# Patient Record
Sex: FEMALE | Race: BLACK OR AFRICAN AMERICAN | NOT HISPANIC OR LATINO | Employment: OTHER | URBAN - METROPOLITAN AREA
[De-identification: names, ages, dates, MRNs, and addresses within clinical notes are randomized per-mention and may not be internally consistent; named-entity substitution may affect disease eponyms.]

---

## 2017-01-12 ENCOUNTER — HISTORICAL (OUTPATIENT)
Dept: INTERNAL MEDICINE | Facility: CLINIC | Age: 57
End: 2017-01-12

## 2017-03-15 ENCOUNTER — HISTORICAL (OUTPATIENT)
Dept: INTERNAL MEDICINE | Facility: CLINIC | Age: 57
End: 2017-03-15

## 2017-06-22 ENCOUNTER — HISTORICAL (OUTPATIENT)
Dept: CARDIOLOGY | Facility: HOSPITAL | Age: 57
End: 2017-06-22

## 2018-01-15 ENCOUNTER — HISTORICAL (OUTPATIENT)
Dept: RADIOLOGY | Facility: HOSPITAL | Age: 58
End: 2018-01-15

## 2018-01-24 ENCOUNTER — HISTORICAL (OUTPATIENT)
Dept: RADIOLOGY | Facility: HOSPITAL | Age: 58
End: 2018-01-24

## 2018-02-01 ENCOUNTER — HISTORICAL (OUTPATIENT)
Dept: RADIOLOGY | Facility: HOSPITAL | Age: 58
End: 2018-02-01

## 2018-05-04 ENCOUNTER — HISTORICAL (OUTPATIENT)
Dept: INTERNAL MEDICINE | Facility: CLINIC | Age: 58
End: 2018-05-04

## 2018-05-04 LAB
ABS NEUT (OLG): 5.12 X10(3)/MCL (ref 2.1–9.2)
ALBUMIN SERPL-MCNC: 3.7 GM/DL (ref 3.4–5)
ALBUMIN/GLOB SERPL: 1 RATIO (ref 1–2)
ALP SERPL-CCNC: 104 UNIT/L (ref 45–117)
ALT SERPL-CCNC: 23 UNIT/L (ref 12–78)
AST SERPL-CCNC: 20 UNIT/L (ref 15–37)
BASOPHILS # BLD AUTO: 0.04 X10(3)/MCL
BASOPHILS NFR BLD AUTO: 0 %
BILIRUB SERPL-MCNC: 0.4 MG/DL (ref 0.2–1)
BILIRUBIN DIRECT+TOT PNL SERPL-MCNC: 0.1 MG/DL
BILIRUBIN DIRECT+TOT PNL SERPL-MCNC: 0.3 MG/DL
BUN SERPL-MCNC: 11 MG/DL (ref 7–18)
CALCIUM SERPL-MCNC: 8.1 MG/DL (ref 8.5–10.1)
CHLORIDE SERPL-SCNC: 107 MMOL/L (ref 98–107)
CHOLEST SERPL-MCNC: 187 MG/DL
CHOLEST/HDLC SERPL: 2.8 {RATIO} (ref 0–4.4)
CO2 SERPL-SCNC: 26 MMOL/L (ref 21–32)
CREAT SERPL-MCNC: 0.9 MG/DL (ref 0.6–1.3)
EOSINOPHIL # BLD AUTO: 0.1 X10(3)/MCL
EOSINOPHIL NFR BLD AUTO: 1 %
ERYTHROCYTE [DISTWIDTH] IN BLOOD BY AUTOMATED COUNT: 13.5 % (ref 11.5–14.5)
EST. AVERAGE GLUCOSE BLD GHB EST-MCNC: 120 MG/DL
GLOBULIN SER-MCNC: 4.1 GM/ML (ref 2.3–3.5)
GLUCOSE SERPL-MCNC: 96 MG/DL (ref 74–106)
HBA1C MFR BLD: 5.8 % (ref 4.2–6.3)
HCT VFR BLD AUTO: 39.9 % (ref 35–46)
HDLC SERPL-MCNC: 66 MG/DL
HGB BLD-MCNC: 13 GM/DL (ref 12–16)
IMM GRANULOCYTES # BLD AUTO: 0.02 10*3/UL
IMM GRANULOCYTES NFR BLD AUTO: 0 %
LDLC SERPL CALC-MCNC: 67 MG/DL (ref 0–130)
LYMPHOCYTES # BLD AUTO: 2.3 X10(3)/MCL
LYMPHOCYTES NFR BLD AUTO: 28 % (ref 13–40)
MCH RBC QN AUTO: 31.9 PG (ref 26–34)
MCHC RBC AUTO-ENTMCNC: 32.6 GM/DL (ref 31–37)
MCV RBC AUTO: 97.8 FL (ref 80–100)
MONOCYTES # BLD AUTO: 0.65 X10(3)/MCL
MONOCYTES NFR BLD AUTO: 8 % (ref 4–12)
NEUTROPHILS # BLD AUTO: 5.12 X10(3)/MCL
NEUTROPHILS NFR BLD AUTO: 62 X10(3)/MCL
PLATELET # BLD AUTO: 351 X10(3)/MCL (ref 130–400)
PMV BLD AUTO: 9.3 FL (ref 7.4–10.4)
POTASSIUM SERPL-SCNC: 3.6 MMOL/L (ref 3.5–5.1)
PROT SERPL-MCNC: 7.8 GM/DL (ref 6.4–8.2)
RBC # BLD AUTO: 4.08 X10(6)/MCL (ref 4–5.2)
SODIUM SERPL-SCNC: 139 MMOL/L (ref 136–145)
TRIGL SERPL-MCNC: 269 MG/DL
VLDLC SERPL CALC-MCNC: 54 MG/DL
WBC # SPEC AUTO: 8.2 X10(3)/MCL (ref 4.5–11)

## 2018-05-07 ENCOUNTER — HISTORICAL (OUTPATIENT)
Dept: INTERNAL MEDICINE | Facility: CLINIC | Age: 58
End: 2018-05-07

## 2018-05-07 LAB
APPEARANCE, UA: ABNORMAL
BACTERIA #/AREA URNS AUTO: ABNORMAL /[HPF]
BILIRUB UR QL STRIP: NEGATIVE
COLOR UR: YELLOW
GLUCOSE (UA): NORMAL
HGB UR QL STRIP: NEGATIVE
HYALINE CASTS #/AREA URNS LPF: ABNORMAL /[LPF]
KETONES UR QL STRIP: NEGATIVE
LEUKOCYTE ESTERASE UR QL STRIP: 500 LEU/UL
MUCOUS THREADS URNS QL MICRO: ABNORMAL
NITRITE UR QL STRIP: NEGATIVE
PH UR STRIP: 6.5 [PH] (ref 4.5–8)
PROT UR QL STRIP: 10 MG/DL
RBC #/AREA URNS AUTO: ABNORMAL /[HPF]
SP GR UR STRIP: 1.02 (ref 1–1.03)
SQUAMOUS #/AREA URNS LPF: >100 /[LPF]
UROBILINOGEN UR STRIP-ACNC: NORMAL
WBC #/AREA URNS AUTO: ABNORMAL /HPF

## 2018-06-19 ENCOUNTER — HISTORICAL (OUTPATIENT)
Dept: RADIOLOGY | Facility: HOSPITAL | Age: 58
End: 2018-06-19

## 2018-09-25 ENCOUNTER — HISTORICAL (OUTPATIENT)
Dept: RADIOLOGY | Facility: HOSPITAL | Age: 58
End: 2018-09-25

## 2018-10-08 ENCOUNTER — HISTORICAL (OUTPATIENT)
Dept: ADMINISTRATIVE | Facility: HOSPITAL | Age: 58
End: 2018-10-08

## 2018-10-15 ENCOUNTER — HISTORICAL (OUTPATIENT)
Dept: INTERNAL MEDICINE | Facility: CLINIC | Age: 58
End: 2018-10-15

## 2018-11-07 ENCOUNTER — HISTORICAL (OUTPATIENT)
Dept: RADIOLOGY | Facility: HOSPITAL | Age: 58
End: 2018-11-07

## 2018-11-16 ENCOUNTER — HISTORICAL (OUTPATIENT)
Dept: RADIOLOGY | Facility: HOSPITAL | Age: 58
End: 2018-11-16

## 2019-12-04 ENCOUNTER — HISTORICAL (OUTPATIENT)
Dept: ADMINISTRATIVE | Facility: HOSPITAL | Age: 59
End: 2019-12-04

## 2019-12-04 LAB
ABS NEUT (OLG): 4.23 X10(3)/MCL (ref 2.1–9.2)
ALBUMIN SERPL-MCNC: 3.8 GM/DL (ref 3.4–5)
ALBUMIN/GLOB SERPL: 0.9 RATIO (ref 1.1–2)
ALP SERPL-CCNC: 102 UNIT/L (ref 45–117)
ALT SERPL-CCNC: 23 UNIT/L (ref 12–78)
AST SERPL-CCNC: 17 UNIT/L (ref 15–37)
BASOPHILS # BLD AUTO: 0 X10(3)/MCL (ref 0–0.2)
BASOPHILS NFR BLD AUTO: 1 %
BILIRUB SERPL-MCNC: 0.8 MG/DL (ref 0.2–1)
BILIRUBIN DIRECT+TOT PNL SERPL-MCNC: 0.2 MG/DL (ref 0–0.2)
BILIRUBIN DIRECT+TOT PNL SERPL-MCNC: 0.6 MG/DL
BUN SERPL-MCNC: 8 MG/DL (ref 7–18)
CALCIUM SERPL-MCNC: 9.2 MG/DL (ref 8.5–10.1)
CHLORIDE SERPL-SCNC: 106 MMOL/L (ref 98–107)
CHOLEST SERPL-MCNC: 235 MG/DL
CHOLEST/HDLC SERPL: 3.3 {RATIO} (ref 0–4.4)
CO2 SERPL-SCNC: 31 MMOL/L (ref 21–32)
CREAT SERPL-MCNC: 1.1 MG/DL (ref 0.6–1.3)
EOSINOPHIL # BLD AUTO: 0.2 X10(3)/MCL (ref 0–0.9)
EOSINOPHIL NFR BLD AUTO: 2 %
ERYTHROCYTE [DISTWIDTH] IN BLOOD BY AUTOMATED COUNT: 14.1 % (ref 11.5–14.5)
EST. AVERAGE GLUCOSE BLD GHB EST-MCNC: 131 MG/DL
GLOBULIN SER-MCNC: 4.2 GM/ML (ref 2.3–3.5)
GLUCOSE SERPL-MCNC: 108 MG/DL (ref 74–106)
HBA1C MFR BLD: 6.2 % (ref 4.2–6.3)
HCT VFR BLD AUTO: 44.1 % (ref 35–46)
HDLC SERPL-MCNC: 71 MG/DL (ref 40–59)
HGB BLD-MCNC: 14.3 GM/DL (ref 12–16)
IMM GRANULOCYTES # BLD AUTO: 0.02 10*3/UL
IMM GRANULOCYTES NFR BLD AUTO: 0 %
LDLC SERPL CALC-MCNC: 151 MG/DL
LYMPHOCYTES # BLD AUTO: 1.9 X10(3)/MCL (ref 0.6–4.6)
LYMPHOCYTES NFR BLD AUTO: 27 %
MCH RBC QN AUTO: 31.7 PG (ref 26–34)
MCHC RBC AUTO-ENTMCNC: 32.4 GM/DL (ref 31–37)
MCV RBC AUTO: 97.8 FL (ref 80–100)
MONOCYTES # BLD AUTO: 0.6 X10(3)/MCL (ref 0.1–1.3)
MONOCYTES NFR BLD AUTO: 8 %
NEUTROPHILS # BLD AUTO: 4.23 X10(3)/MCL (ref 2.1–9.2)
NEUTROPHILS NFR BLD AUTO: 62 %
PLATELET # BLD AUTO: 407 X10(3)/MCL (ref 130–400)
PMV BLD AUTO: 9.4 FL (ref 7.4–10.4)
POTASSIUM SERPL-SCNC: 4.4 MMOL/L (ref 3.5–5.1)
PROT SERPL-MCNC: 8 GM/DL (ref 6.4–8.2)
RBC # BLD AUTO: 4.51 X10(6)/MCL (ref 4–5.2)
SODIUM SERPL-SCNC: 141 MMOL/L (ref 136–145)
TRIGL SERPL-MCNC: 65 MG/DL
TSH SERPL-ACNC: 1.57 MIU/L (ref 0.36–3.74)
VLDLC SERPL CALC-MCNC: 13 MG/DL
WBC # SPEC AUTO: 6.9 X10(3)/MCL (ref 4.5–11)

## 2023-08-12 ENCOUNTER — HOSPITAL ENCOUNTER (EMERGENCY)
Facility: HOSPITAL | Age: 63
Discharge: HOME | End: 2023-08-12
Attending: STUDENT IN AN ORGANIZED HEALTH CARE EDUCATION/TRAINING PROGRAM
Payer: MEDICAID

## 2023-08-12 VITALS
OXYGEN SATURATION: 94 % | DIASTOLIC BLOOD PRESSURE: 83 MMHG | TEMPERATURE: 98 F | RESPIRATION RATE: 15 BRPM | SYSTOLIC BLOOD PRESSURE: 132 MMHG | HEIGHT: 66 IN | BODY MASS INDEX: 34.72 KG/M2 | WEIGHT: 216 LBS | HEART RATE: 88 BPM

## 2023-08-12 DIAGNOSIS — M79.642 LEFT HAND PAIN: ICD-10-CM

## 2023-08-12 DIAGNOSIS — S62.317A CLOSED DISPLACED FRACTURE OF BASE OF FIFTH METACARPAL BONE OF LEFT HAND, INITIAL ENCOUNTER: Primary | ICD-10-CM

## 2023-08-12 DIAGNOSIS — R52 PAIN: ICD-10-CM

## 2023-08-12 DIAGNOSIS — S00.03XA CONTUSION OF SCALP, INITIAL ENCOUNTER: ICD-10-CM
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
## 2023-08-12 PROCEDURE — 63600175 PHARM REV CODE 636 W HCPCS: Performed by: STUDENT IN AN ORGANIZED HEALTH CARE EDUCATION/TRAINING PROGRAM

## 2023-08-12 PROCEDURE — 99284 EMERGENCY DEPT VISIT MOD MDM: CPT | Mod: 25

## 2023-08-12 PROCEDURE — 96375 TX/PRO/DX INJ NEW DRUG ADDON: CPT

## 2023-08-12 PROCEDURE — 29125 APPL SHORT ARM SPLINT STATIC: CPT | Mod: LT

## 2023-08-12 PROCEDURE — 96374 THER/PROPH/DIAG INJ IV PUSH: CPT

## 2023-08-12 PROCEDURE — 96376 TX/PRO/DX INJ SAME DRUG ADON: CPT

## 2023-08-12 RX ORDER — FENTANYL CITRATE 50 UG/ML
50 INJECTION, SOLUTION INTRAMUSCULAR; INTRAVENOUS
Status: COMPLETED | OUTPATIENT
Start: 2023-08-12 | End: 2023-08-12

## 2023-08-12 RX ORDER — HYDROCODONE BITARTRATE AND ACETAMINOPHEN 5; 325 MG/1; MG/1
1 TABLET ORAL EVERY 8 HOURS PRN
Qty: 10 TABLET | Refills: 0 | Status: SHIPPED | OUTPATIENT
Start: 2023-08-12 | End: 2023-08-17

## 2023-08-12 RX ORDER — ONDANSETRON 2 MG/ML
4 INJECTION INTRAMUSCULAR; INTRAVENOUS
Status: COMPLETED | OUTPATIENT
Start: 2023-08-12 | End: 2023-08-12

## 2023-08-12 RX ORDER — METHOCARBAMOL 1000 MG/1
1000 TABLET, COATED ORAL 3 TIMES DAILY
Qty: 15 TABLET | Refills: 0 | Status: SHIPPED | OUTPATIENT
Start: 2023-08-12 | End: 2023-08-17

## 2023-08-12 RX ORDER — MORPHINE SULFATE 4 MG/ML
4 INJECTION, SOLUTION INTRAMUSCULAR; INTRAVENOUS
Status: COMPLETED | OUTPATIENT
Start: 2023-08-12 | End: 2023-08-12

## 2023-08-12 RX ADMIN — FENTANYL CITRATE 50 MCG: 50 INJECTION, SOLUTION INTRAMUSCULAR; INTRAVENOUS at 11:08

## 2023-08-12 RX ADMIN — ONDANSETRON 4 MG: 2 INJECTION INTRAMUSCULAR; INTRAVENOUS at 12:08

## 2023-08-12 RX ADMIN — ONDANSETRON 4 MG: 2 INJECTION INTRAMUSCULAR; INTRAVENOUS at 11:08

## 2023-08-12 RX ADMIN — MORPHINE SULFATE 4 MG: 4 INJECTION INTRAVENOUS at 12:08

## 2023-08-12 NOTE — DISCHARGE INSTRUCTIONS
Follow-up with the primary care physician.      Follow-up with orthopedic surgery.      Keep hand and splint.      You may take pain medication if having severe pain.      Return to the emergency department for any new or worsening symptoms, nausea, vomiting, fever, difficulty breathing, chest pain, or any other concerns.    Follow up with your primary care physician or provider in 1-2 days.      Return to the emergency department if any you have any worsening symptoms, new symptoms, or any other concerns.      Please signup for MyChart as noted below in your paperwork to review all labwork, imaging results, and any other incidental findings from today's visit.    If you had radiology exams like an XRAY or CT in the emergency Department.  Bring these to your primary care doctor and/or specialist for further review of incidental findings.    Please review any LAB WORK from your visit today with your primary care physician.    If you were prescribed OPIATE PAIN MEDICATION - please understand of these medications can be addictive, you may fill less of the prescription was written for, you do not have to take the full prescription.  You may discard what you do not use.  Please seek help if you feel you are having problems with addiction.  Do not drive or operate heavy machinery if you are taking sedating medications.  Do not mix these medications with alcohol.      If you had a SPLINT placed in the emergency department if you have severe pain numbness tingling or discoloration of year digits please remove the splint and return to the emergency department for further evaluation as this may represent a sign of compromise to the nerves or blood vessels due to swelling.

## 2023-08-12 NOTE — ED PROVIDER NOTES
Encounter Date: 8/12/2023    SCRIBE #1 NOTE: I, Catia Mtz, am scribing for, and in the presence of,  Alexander Vanegas MD. I have scribed the following portions of the note - Other sections scribed: HPI, ROS, PE.       History     Chief Complaint   Patient presents with    Fall     Pt trip and fell, left hand pain/ swelling. Able to move all fingers. Pt has abrasion to L side of head from fall. Neg loc neg thinners. C/o head pain and neck pain.      62 year old female presents to the ED via EMS after a fall occurring this morning. Patient reports she was outside with her dogs when one of them got tangled in her legs causing her to trip and fall landing on her left hand. Patient does know if she lost consciousness. She reports left hand pain, left head pain, and neck pain. She denies left elbow pain. She denies the use of blood thinners.     The history is provided by the patient. No  was used.   Fall  The accident occurred just prior to arrival. The fall occurred while walking. Distance fallen: Ground level. She landed on [place]. Point of impact: Left hand. The pain is present in the head, neck and left wrist. She was Ambulatory at the scene. There was No entrapment after the fall. Associated symptoms include neck pain. Pertinent negatives include no fever and no abdominal pain. She has tried nothing for the symptoms. The treatment provided no relief.     Review of patient's allergies indicates:   Allergen Reactions    Haloperidol lactate      History reviewed. No pertinent past medical history.  History reviewed. No pertinent surgical history.  History reviewed. No pertinent family history.     Review of Systems   Constitutional:  Negative for fever.   HENT:  Negative for sore throat.    Eyes:  Negative for visual disturbance.   Respiratory:  Negative for shortness of breath.    Cardiovascular:  Negative for chest pain.   Gastrointestinal:  Negative for abdominal pain.   Genitourinary:   Negative for dysuria.   Musculoskeletal:  Positive for neck pain. Negative for joint swelling.        Left hand pain   Skin:  Negative for rash.   Neurological:  Negative for weakness.   Psychiatric/Behavioral:  Negative for confusion.        Physical Exam     Initial Vitals   BP Pulse Resp Temp SpO2   08/12/23 1050 08/12/23 1050 08/12/23 1102 08/12/23 1050 08/12/23 1050   (!) 130/97 87 (!) 26 98.1 °F (36.7 °C) 95 %      MAP       --                Physical Exam    Nursing note and vitals reviewed.  Constitutional: She appears well-developed and well-nourished. She is not diaphoretic. No distress.   HENT:   Head: Normocephalic and atraumatic.   Contusion to the left scalp. No abrasions, contusions, lacerations to the scalp or face.  No superior inferior orbital ridge tenderness to palpation.  No zygomatic arch tenderness to palpation.  No epistaxis.  No CSF rhinorrhea.  No septal hematoma.  No intraoral injuries noted.  Normal external ear.  No raccoon eyes.  No Ray sign.     Eyes: Conjunctivae and EOM are normal. Pupils are equal, round, and reactive to light.   Neck:   Normal range of motion.  Cardiovascular:  Normal rate, regular rhythm, normal heart sounds and intact distal pulses.           No murmur heard.  Pulmonary/Chest: Breath sounds normal. No respiratory distress. She has no wheezes. She has no rales.   Abdominal: Abdomen is soft. She exhibits no distension. There is no abdominal tenderness.   Musculoskeletal:         General: Tenderness and edema present.      Cervical back: Normal range of motion.      Comments: No C,T or L-spine vertebral point tenderness to palpation, no step-offs, no deformities.  Right upper extremity:  Full range of motion of shoulder, elbow, wrist, no deformity or tenderness to palpation.   Left upper extremity: Full range of motion of shoulder, elbow, wrist, no deformity or tenderness to palpation.  Contusion to the lateral aspect of the left hand, TTP overlying 4th/5th  metacarpal.    Right lower extremity:  Full range of motion of hip, knee, ankle, no tenderness palpation or deformity noted.  Left lower extremity:  Full range of motion of hip, knee, ankle, no tenderness palpation or deformity noted.       Neurological: She is alert and oriented to person, place, and time. No cranial nerve deficit.   Skin: Skin is warm. No rash noted. No erythema.         ED Course   Splint Application    Date/Time: 8/12/2023 1:00 PM    Performed by: Alexander Vanegas MD  Authorized by: Alexander Vanegas MD  Location details: left hand  Splint type: ulnar gutter  Post-procedure: The splinted body part was neurovascularly unchanged following the procedure.  Patient tolerance: Patient tolerated the procedure well with no immediate complications        Labs Reviewed - No data to display       Imaging Results              CT Head Without Contrast (Final result)  Result time 08/12/23 12:10:31      Final result by Henry Arceo MD (08/12/23 12:10:31)                   Impression:      No acute intracranial abnormality identified.      Electronically signed by: Henry Arceo  Date:    08/12/2023  Time:    12:10               Narrative:    EXAMINATION:  CT HEAD WITHOUT CONTRAST    CLINICAL HISTORY:  head trauma, fall;    TECHNIQUE:  Low dose axial images were obtained through the head.  Coronal and sagittal reformations were also performed. Contrast was not administered.    Automatic exposure control was utilized to reduce the patient's radiation dose.    DLP= 1281    COMPARISON:  01/24/2018    FINDINGS:  No acute intracranial hemorrhage, edema or mass. No acute parenchymal abnormality.    Mild cerebral atrophy with concordant ventricular enlargement.    There is normal gray white differentiation.    The osseous structures are normal.    The mastoid air cells are clear.    The auditory canals are patent bilaterally.    The globes and orbital contents are normal bilaterally.    The visualized maxillary,  ethmoid and sphenoid sinuses are clear.                                       CT Cervical Spine Without Contrast (Final result)  Result time 08/12/23 12:08:58      Final result by Henry Arceo MD (08/12/23 12:08:58)                   Impression:      1. No acute cervical spine abnormality identified.    2. Ligament, spinal cord and/or vascular abnormalities cannot be excluded on the basis of this examination.    Postsurgical change with moderate degenerative change      Electronically signed by: Henry Arceo  Date:    08/12/2023  Time:    12:08               Narrative:    EXAMINATION:  CT CERVICAL SPINE WITHOUT CONTRAST    CLINICAL HISTORY:  Fall, neck pain;    TECHNIQUE:  CT of the cervical spine Without contrast. Sagittal and coronal reconstructions were performed on the source images.    Automatic exposure control was utilized to reduce the patient's radiation dose.    DLP = 1281    COMPARISON:  06/19/2018    FINDINGS:  Straightening of the normal lordotic curvature.  Anterior fusion of C4 through C7.  Neural foraminal narrowing greatest at C7-T1.  Visualized soft tissues are unremarkable.                                       X-Ray Wrist Complete Left (Final result)  Result time 08/12/23 11:26:10      Final result by Henry Arceo MD (08/12/23 11:26:10)                   Impression:      As above.      Electronically signed by: Henry Arceo  Date:    08/12/2023  Time:    11:26               Narrative:    EXAMINATION:  XR WRIST COMPLETE 3 VIEWS LEFT    CLINICAL HISTORY:  Pain, unspecified    TECHNIQUE:  Radiographs of the left wrist with AP, lateral and oblique  views.    COMPARISON:  No prior imaging available for comparison    FINDINGS:  The carpal bones appear intact.  Degenerative changes with cystic formation in the lunate.  Degenerative changes with osteophytes of the 1st CMC joint.  Nondisplaced proximal 5th metacarpal fracture.                                       X-Ray Hand 3 View Left  (Final result)  Result time 08/12/23 11:25:04      Final result by Henry Arceo MD (08/12/23 11:25:04)                   Impression:      Minimally displaced fracture of the proximal 5th metacarpal..  Degenerative changes with osteophytes of the 1st CMC joint.      Electronically signed by: Henry Arceo  Date:    08/12/2023  Time:    11:25               Narrative:    EXAMINATION:  XR HAND COMPLETE 3 VIEW LEFT    CLINICAL HISTORY:  Hand pain;    TECHNIQUE:  Radiographs of the left hand with AP, lateral and oblique  views.    COMPARISON:  No prior imaging available for comparison    FINDINGS:  Fracture of the proximal 5th meta carpal.  Degenerative changes of the 1st CMC joint.  No gross edema.                                       X-Ray Chest AP Portable (Final result)  Result time 08/12/23 11:22:27      Final result by Henry Arceo MD (08/12/23 11:22:27)                   Impression:      No acute cardiopulmonary process.      Electronically signed by: Henry Arceo  Date:    08/12/2023  Time:    11:22               Narrative:    EXAMINATION:  XR CHEST AP PORTABLE    CLINICAL HISTORY:  fall;    TECHNIQUE:  Single view of the chest    COMPARISON:  09/20/2017    FINDINGS:  No focal opacification, pleural effusion, or pneumothorax.    The cardiomediastinal silhouette is within normal limits.    No acute osseous abnormality.                                    X-Rays:   Independently Interpreted Readings:   Chest X-Ray: Normal heart size.  No infiltrates.  No acute abnormalities.     Medications   fentaNYL injection 50 mcg (50 mcg Intravenous Given 8/12/23 1121)   ondansetron injection 4 mg (4 mg Intravenous Given 8/12/23 1121)   morphine injection 4 mg (4 mg Intravenous Given 8/12/23 1219)   ondansetron injection 4 mg (4 mg Intravenous Given 8/12/23 1219)     Medical Decision Making:   History:   I obtained history from: someone other than patient and EMS provider.       <> Summary of History: Collateral  from family at bedside.   Old Medical Records: I decided to obtain old medical records.  Old Records Summarized: records from clinic visits, records from previous admission(s) and records from another hospital.       <> Summary of Records: Chronic knee pain  Initial Assessment:   Trauma  Differential Diagnosis:   Judging by the patient's chief complaint and pertinent history, the patient has the following possible differential diagnoses, including but not limited to the following.  Some of these are deemed to be lower likelihood and some more likely based on my physical exam and history combined with possible lab work and/or imaging studies.   Please see the pertinent studies, and refer to the HPI.  Some of these diagnoses will take further evaluation to fully rule out, perhaps as an outpatient and the patient was encouraged to follow up when discharged for more comprehensive evaluation.      abrasion, contusion, fracture, traumatic ICH, TBI, concussion, fracture,  hemorrhage, laceration     Independently Interpreted Test(s):   I have ordered and independently interpreted X-rays - see prior notes.  Clinical Tests:   Radiological Study: Ordered and Reviewed  ED Management:    Patient is a 62-year-old female presents to the emergency room after mechanical trip and fall.  See HPI.  See physical exam.  CT of the head without any acute abnormalities.  CT of the cervical spine without any fracture.  C-spine cleared using nexus criteria.  Patient denies any chest pain, abdominal pain, back pain.  Plain films with evidence of 5th metacarpal fracture.  Placed in an ulnar gutter splint.  Pain controlled.  Splint applied.  Neurovascularly intact after splint placement.  Able to ambulate without difficulty.  Repeat tertiary exam without any evidence new pain or swelling.  Discussed all results with the patient family.  Discussed need for follow-up with orthopedic surgery.  Discussed if any new pain elsewhere or worsening pain  may require further imaging in the form of additional plain films, CT or MRI.  Discussed all results.  Return precautions discussed.  Hemodynamically stable for continued outpatient management strict return precautions.  Patient and family verbalized understanding agreed to plan.  Other:   I have discussed this case with another health care provider.    Medical Decision Making  Problems Addressed:  Closed displaced fracture of base of fifth metacarpal bone of left hand, initial encounter: acute illness or injury that poses a threat to life or bodily functions  Contusion of scalp, initial encounter: acute illness or injury that poses a threat to life or bodily functions  Fall, initial encounter: acute illness or injury that poses a threat to life or bodily functions  Left hand pain: acute illness or injury that poses a threat to life or bodily functions  Pain: acute illness or injury that poses a threat to life or bodily functions    Amount and/or Complexity of Data Reviewed  Radiology: ordered and independent interpretation performed.    Risk  Prescription drug management.  Parenteral controlled substances.               Attending Attestation:           Physician Attestation for Scribe:  Physician Attestation Statement for Scribe #1: I, Alexander Vanegas MD, reviewed documentation, as scribed by Catia Mtz in my presence, and it is both accurate and complete.                          Clinical Impression:   Final diagnoses:  [R52] Pain  [M79.642] Left hand pain  [S62.317A] Closed displaced fracture of base of fifth metacarpal bone of left hand, initial encounter (Primary)  [W19.XXXA] Fall, initial encounter  [S00.03XA] Contusion of scalp, initial encounter        ED Disposition Condition    Discharge Stable          ED Prescriptions       Medication Sig Dispense Start Date End Date Auth. Provider    HYDROcodone-acetaminophen (NORCO) 5-325 mg per tablet Take 1 tablet by mouth every 8 (eight) hours as needed for  Pain. 10 tablet 8/12/2023 8/17/2023 Alexander Vanegas MD    methocarbamoL 1,000 mg Tab Take 1,000 mg by mouth 3 (three) times daily. for 5 days 15 tablet 8/12/2023 8/17/2023 Alexander Vanegas MD          Follow-up Information       Follow up With Specialties Details Why Contact Info    Crispin Pineda MD Hand Surgery, Orthopedic Surgery   [address] [facility] [address] [place] [id number]  [phone]      Jen Snider, [facility]   [address] [facility] [address]  [phone]      Thomas Alcocer O,  Orthopedic Surgery   [address] [facility] [address]  [facility] [id number] [phone]      Abdon Flores Jr., MD Orthopedic Surgery   [address] [facility] [address] [place] [id number] [phone]               Alexander Vanegas MD  08/13/23 2014

## 2023-08-21 ENCOUNTER — OFFICE VISIT (OUTPATIENT)
Dept: ORTHOPEDICS | Facility: CLINIC | Age: 63
End: 2023-08-21
Payer: MEDICAID

## 2023-08-21 ENCOUNTER — HOSPITAL ENCOUNTER (OUTPATIENT)
Dept: RADIOLOGY | Facility: CLINIC | Age: 63
Discharge: HOME | End: 2023-08-21
Attending: ORTHOPAEDIC SURGERY
Payer: MEDICAID

## 2023-08-21 VITALS
TEMPERATURE: 97 F | WEIGHT: 216 LBS | BODY MASS INDEX: 34.72 KG/M2 | HEIGHT: 66 IN | HEART RATE: 86 BPM | DIASTOLIC BLOOD PRESSURE: 74 MMHG | SYSTOLIC BLOOD PRESSURE: 132 MMHG

## 2023-08-21 DIAGNOSIS — S62.347A CLOSED NONDISPLACED FRACTURE OF BASE OF FIFTH METACARPAL BONE OF LEFT HAND, INITIAL ENCOUNTER: ICD-10-CM

## 2023-08-21 DIAGNOSIS — S62.347A CLOSED NONDISPLACED FRACTURE OF BASE OF FIFTH METACARPAL BONE OF LEFT HAND, INITIAL ENCOUNTER: Primary | ICD-10-CM

## 2023-08-21 PROCEDURE — 1159F MED LIST DOCD IN RCRD: CPT | Mod: CPTII,,, | Performed by: ORTHOPAEDIC SURGERY

## 2023-08-21 PROCEDURE — 3075F PR MOST RECENT SYSTOLIC BLOOD PRESS GE 130-139MM HG: ICD-10-PCS | Mod: CPTII,,, | Performed by: ORTHOPAEDIC SURGERY

## 2023-08-21 PROCEDURE — 99204 PR OFFICE/OUTPT VISIT, NEW, LEVL IV, 45-59 MIN: ICD-10-PCS | Mod: ,,, | Performed by: ORTHOPAEDIC SURGERY

## 2023-08-21 PROCEDURE — 3008F BODY MASS INDEX DOCD: CPT | Mod: CPTII,,, | Performed by: ORTHOPAEDIC SURGERY

## 2023-08-21 PROCEDURE — 3078F PR MOST RECENT DIASTOLIC BLOOD PRESSURE < 80 MM HG: ICD-10-PCS | Mod: CPTII,,, | Performed by: ORTHOPAEDIC SURGERY

## 2023-08-21 PROCEDURE — 99204 OFFICE O/P NEW MOD 45 MIN: CPT | Mod: ,,, | Performed by: ORTHOPAEDIC SURGERY

## 2023-08-21 PROCEDURE — 73130 XR HAND COMPLETE 3 VIEW LEFT: ICD-10-PCS | Mod: LT,,, | Performed by: ORTHOPAEDIC SURGERY

## 2023-08-21 PROCEDURE — 73130 X-RAY EXAM OF HAND: CPT | Mod: LT,,, | Performed by: ORTHOPAEDIC SURGERY

## 2023-08-21 PROCEDURE — 1159F PR MEDICATION LIST DOCUMENTED IN MEDICAL RECORD: ICD-10-PCS | Mod: CPTII,,, | Performed by: ORTHOPAEDIC SURGERY

## 2023-08-21 PROCEDURE — 3075F SYST BP GE 130 - 139MM HG: CPT | Mod: CPTII,,, | Performed by: ORTHOPAEDIC SURGERY

## 2023-08-21 PROCEDURE — 3008F PR BODY MASS INDEX (BMI) DOCUMENTED: ICD-10-PCS | Mod: CPTII,,, | Performed by: ORTHOPAEDIC SURGERY

## 2023-08-21 PROCEDURE — 3078F DIAST BP <80 MM HG: CPT | Mod: CPTII,,, | Performed by: ORTHOPAEDIC SURGERY

## 2023-08-21 RX ORDER — GABAPENTIN 600 MG/1
TABLET ORAL
COMMUNITY

## 2023-08-21 RX ORDER — RISPERIDONE 0.5 MG/1
0.5 TABLET ORAL 2 TIMES DAILY
COMMUNITY
Start: 2023-08-16

## 2023-08-21 RX ORDER — PANTOPRAZOLE SODIUM 20 MG/1
TABLET, DELAYED RELEASE ORAL
COMMUNITY

## 2023-08-21 RX ORDER — CARIPRAZINE 4.5 MG/1
1 CAPSULE, GELATIN COATED ORAL
COMMUNITY

## 2023-08-21 RX ORDER — DICLOFENAC SODIUM 10 MG/G
GEL TOPICAL
COMMUNITY
End: 2023-11-08

## 2023-08-21 RX ORDER — ATORVASTATIN CALCIUM 40 MG/1
TABLET, FILM COATED ORAL
COMMUNITY

## 2023-08-21 RX ORDER — TRAZODONE HYDROCHLORIDE 50 MG/1
TABLET ORAL
COMMUNITY

## 2023-08-21 RX ORDER — MECLIZINE HYDROCHLORIDE 25 MG/1
TABLET ORAL
COMMUNITY

## 2023-08-21 RX ORDER — PREDNISONE 20 MG/1
TABLET ORAL
COMMUNITY

## 2023-08-21 RX ORDER — DIPHENHYDRAMINE HCL 50 MG
CAPSULE ORAL
COMMUNITY

## 2023-08-21 RX ORDER — MULTIVITAMIN WITH FOLIC ACID 400 MCG
1 TABLET ORAL
COMMUNITY
Start: 2023-08-16

## 2023-08-21 RX ORDER — ISOSORBIDE MONONITRATE 30 MG/1
30 TABLET, EXTENDED RELEASE ORAL
COMMUNITY
Start: 2023-08-16

## 2023-08-21 RX ORDER — BUSPIRONE HYDROCHLORIDE 10 MG/1
10 TABLET ORAL 3 TIMES DAILY
COMMUNITY
Start: 2023-08-16

## 2023-08-21 RX ORDER — LACTULOSE 10 G/15ML
SOLUTION ORAL
COMMUNITY
Start: 2023-05-12 | End: 2023-11-07

## 2023-08-21 RX ORDER — AMLODIPINE BESYLATE 10 MG/1
TABLET ORAL
COMMUNITY

## 2023-08-21 RX ORDER — CYCLOBENZAPRINE HCL 10 MG
TABLET ORAL
COMMUNITY
End: 2023-09-09

## 2023-08-21 RX ORDER — OMEPRAZOLE 20 MG/1
1 CAPSULE, DELAYED RELEASE ORAL
COMMUNITY

## 2023-08-21 RX ORDER — BENZTROPINE MESYLATE 0.5 MG/1
0.5 TABLET ORAL 2 TIMES DAILY
COMMUNITY
Start: 2023-08-16

## 2023-08-21 RX ORDER — ASPIRIN 325 MG
50000 TABLET, DELAYED RELEASE (ENTERIC COATED) ORAL
COMMUNITY
Start: 2023-08-16

## 2023-08-21 RX ORDER — DOCUSATE SODIUM 100 MG/1
CAPSULE, LIQUID FILLED ORAL
COMMUNITY

## 2023-08-21 RX ORDER — DULOXETIN HYDROCHLORIDE 60 MG/1
1 CAPSULE, DELAYED RELEASE ORAL
COMMUNITY

## 2023-08-21 NOTE — PROGRESS NOTES
Subjective:       Patient ID: Do Rosado is a 62 y.o. female.  Chief Complaint   Patient presents with    Left Hand - Injury     9 day f/u from ER after fall outside. She is present in splint. Denies increase in pain or discomfort.          HPI:  Patient is 9 days follow-up for a fall outside.  Patient has a significant history of bipolar disorder.  She has left hand pain ecchymosis without signs of abrasions no open injury no numbness tingling.  Dull achy pain without radiation better rest worse with significant range of motion.  Here for ER follow-up    ROS:  Constitutional: Denies fever chills  Eyes: No change in vision  ENT: No ringing or current infections  CV: No chest pain  Resp: No labored breathing  MSK: Pain evident at site of injury located in HPI,   Integ: No signs of abrasions or lacerations  Neuro: No numbness or tingling  Lymphatic: No swelling outside the area of injury     Past Medical History:   Diagnosis Date    Hypertension     Mixed hyperlipidemia       History reviewed. No pertinent surgical history.   Current Outpatient Medications on File Prior to Visit   Medication Sig Dispense Refill    amLODIPine (NORVASC) 10 MG tablet 1 tablet Orally Once a day for 30 days      atorvastatin (LIPITOR) 40 MG tablet 1 tablet Orally Once a day for 30 days      benztropine (COGENTIN) 0.5 MG tablet Take 0.5 mg by mouth 2 (two) times daily.      busPIRone (BUSPAR) 10 MG tablet Take 10 mg by mouth 3 (three) times daily.      cariprazine (VRAYLAR) 4.5 mg Cap 1 capsule.      cholecalciferol, vitamin D3, 1,250 mcg (50,000 unit) capsule Take 50,000 Units by mouth every 7 days.      cyclobenzaprine (FLEXERIL) 10 MG tablet 1 tab Orally TID as needed for muscle spasms for 30 days      diclofenac sodium (VOLTAREN) 1 % Gel 2 gm Transdermal 4 times a day as needed to affected area for 30 days      diphenhydrAMINE (BENADRYL) 50 MG capsule 2 tablets as needed Orally once daily at bedtime      docusate sodium  "(COLACE) 100 MG capsule 1 capsule as needed Orally Once a day for 30 days      DULoxetine (CYMBALTA) 60 MG capsule 1 capsule.      gabapentin (NEURONTIN) 600 MG tablet 1 tablet Orally three times per day for 30 day(s)      isosorbide mononitrate (IMDUR) 30 MG 24 hr tablet Take 30 mg by mouth.      lactulose 10 gram/15 ml (CHRONULAC) 10 gram/15 mL (15 mL) solution 30 ml Orally Once a day as needed for constipation for 30 days      meclizine (ANTIVERT) 25 mg tablet 1 tablet as needed Orally three times daily as needed for dizziness for 30 day(s)      omeprazole (PRILOSEC) 20 MG capsule 1 capsule.      pantoprazole (PROTONIX) 20 MG tablet SMARTSI Tablet(s) By Mouth Daily      predniSONE (DELTASONE) 20 MG tablet 1 tablet Orally BID for 5 day(s)      risperiDONE (RISPERDAL) 0.5 MG Tab Take 0.5 mg by mouth 2 (two) times daily.      TAB-A-PEYTON 400 mcg Tab Take 1 tablet by mouth.      traZODone (DESYREL) 50 MG tablet 1 tablet at bedtime as needed Orally Once a day       No current facility-administered medications on file prior to visit.      Family History   Family history unknown: Yes      Social History     Tobacco Use    Smoking status: Never    Smokeless tobacco: Never   Substance Use Topics    Alcohol use: Yes      No LMP recorded (lmp unknown).          Objective:      /74   Pulse 86   Temp 97 °F (36.1 °C)   Ht 5' 6" (1.676 m)   Wt 98 kg (216 lb)   LMP  (LMP Unknown)   BMI 34.86 kg/m²   General the patient is alert and oriented x3 no acute distress nontoxic-appearing appropriate affect.    Constitutional: Vital signs are examined and stable.  Resp: No signs of labored breathing    LUE: --Skin:  No skin deformity no open ecchymosis No signs of new abrasions or lacerations, no scars           -MSK: STR 5/5 AIN/PIN/Median/Radial/Ulnar motor           -Neuro:  Sensation intact to light touch C5-T1 dermatomes           -Lymphatic: No signs of lymphadenopathy, No signs of swelling,           -CV:Capillary " refill is less than 2 seconds. Radial and ulnar pulses 2/4. Compartments Soft and compressible     Body mass index is 34.86 kg/m².  Ideal body weight: 59.3 kg (130 lb 11.7 oz)  Adjusted ideal body weight: 74.8 kg (164 lb 13.4 oz)  Lab Results   Component Value Date     12/04/2019    K 4.4 12/04/2019    CO2 31 12/04/2019    CALCIUM 9.2 12/04/2019    BUN 8 12/04/2019      Lab Results   Component Value Date    HGB 14.3 12/04/2019    HCT 44.1 12/04/2019       Radiology:  Three views left hand skeletally mature individual showing a left 5th metacarpal base fracture oblique mildly displaced        Assessment:         1. Closed nondisplaced fracture of base of fifth metacarpal bone of left hand, initial encounter  X-Ray Hand 3 view Left    CT Hand Without Contrast Left              Plan:         No follow-ups on file.    Do was seen today for injury.    Diagnoses and all orders for this visit:    Closed nondisplaced fracture of base of fifth metacarpal bone of left hand, initial encounter  -     X-Ray Hand 3 view Left; Future  -     CT Hand Without Contrast Left; Future      Patient has a left 5th metacarpal base fracture.  This may meet surgical indications.  We will CT her left hand look for reduction.  If it is significantly mallet reduced we will send her to our hand surgeon for off operative evaluation.  We will place her in an Exos splint today she will be nonweightbearing follow up 2 weeks.  I discussed this case with Dr. Pineda hand surgeon.  We will continue to converse about her fracture.  Including possible surgical evaluation.        This note/OR report was created with the assistance of  voice recognition software or phone  dictation.  There may be transcription errors as a result of using this technology however minimal. Effort has been made to assure accuracy of transcription but any obvious errors or omissions should be clarified with the author of the document.     No future appointments.         Thomas Alcocer DO  Orthopedic Trauma Surgery  08/21/2023

## 2023-09-12 ENCOUNTER — OFFICE VISIT (OUTPATIENT)
Dept: ORTHOPEDICS | Facility: CLINIC | Age: 63
End: 2023-09-12
Payer: MEDICAID

## 2023-09-12 VITALS
BODY MASS INDEX: 34.72 KG/M2 | HEART RATE: 78 BPM | HEIGHT: 66 IN | DIASTOLIC BLOOD PRESSURE: 85 MMHG | SYSTOLIC BLOOD PRESSURE: 126 MMHG | WEIGHT: 216 LBS

## 2023-09-12 DIAGNOSIS — S62.347A CLOSED NONDISPLACED FRACTURE OF BASE OF FIFTH METACARPAL BONE OF LEFT HAND, INITIAL ENCOUNTER: Primary | ICD-10-CM

## 2023-09-12 PROCEDURE — 3079F PR MOST RECENT DIASTOLIC BLOOD PRESSURE 80-89 MM HG: ICD-10-PCS | Mod: CPTII,,, | Performed by: ORTHOPAEDIC SURGERY

## 2023-09-12 PROCEDURE — 3074F SYST BP LT 130 MM HG: CPT | Mod: CPTII,,, | Performed by: ORTHOPAEDIC SURGERY

## 2023-09-12 PROCEDURE — 3074F PR MOST RECENT SYSTOLIC BLOOD PRESSURE < 130 MM HG: ICD-10-PCS | Mod: CPTII,,, | Performed by: ORTHOPAEDIC SURGERY

## 2023-09-12 PROCEDURE — 3008F PR BODY MASS INDEX (BMI) DOCUMENTED: ICD-10-PCS | Mod: CPTII,,, | Performed by: ORTHOPAEDIC SURGERY

## 2023-09-12 PROCEDURE — 3079F DIAST BP 80-89 MM HG: CPT | Mod: CPTII,,, | Performed by: ORTHOPAEDIC SURGERY

## 2023-09-12 PROCEDURE — 99213 OFFICE O/P EST LOW 20 MIN: CPT | Mod: ,,, | Performed by: ORTHOPAEDIC SURGERY

## 2023-09-12 PROCEDURE — 1159F PR MEDICATION LIST DOCUMENTED IN MEDICAL RECORD: ICD-10-PCS | Mod: CPTII,,, | Performed by: ORTHOPAEDIC SURGERY

## 2023-09-12 PROCEDURE — 1159F MED LIST DOCD IN RCRD: CPT | Mod: CPTII,,, | Performed by: ORTHOPAEDIC SURGERY

## 2023-09-12 PROCEDURE — 99213 PR OFFICE/OUTPT VISIT, EST, LEVL III, 20-29 MIN: ICD-10-PCS | Mod: ,,, | Performed by: ORTHOPAEDIC SURGERY

## 2023-09-12 PROCEDURE — 3008F BODY MASS INDEX DOCD: CPT | Mod: CPTII,,, | Performed by: ORTHOPAEDIC SURGERY

## 2023-09-12 RX ORDER — HYDROCODONE BITARTRATE AND ACETAMINOPHEN 5; 325 MG/1; MG/1
1 TABLET ORAL EVERY 6 HOURS PRN
Qty: 28 TABLET | Refills: 0 | Status: SHIPPED | OUTPATIENT
Start: 2023-09-12

## 2023-09-12 NOTE — PROGRESS NOTES
Subjective:       Patient ID: Do Rosado is a 62 y.o. female.  Chief Complaint   Patient presents with    Results     4.5 wk L proximal 5th MC fx. presents for CT results. she is still not able to bend her fingers inwards. ambulating with a splint today.          HPI:  Patient is 4.5 weeks follow-up for a fall outside.  Patient has a significant history of bipolar disorder.  Has some pain in the left wrist although it is improving no significant swelling no skin breakdown.  No radiation of the pain.  States she does need some pain medication currently.  No numbness tingling.    ROS:  Constitutional: Denies fever chills  Eyes: No change in vision  ENT: No ringing or current infections  CV: No chest pain  Resp: No labored breathing  MSK: Pain evident at site of injury located in HPI,   Integ: No signs of abrasions or lacerations  Neuro: No numbness or tingling  Lymphatic: No swelling outside the area of injury     Past Medical History:   Diagnosis Date    Hypertension     Mixed hyperlipidemia       History reviewed. No pertinent surgical history.   Current Outpatient Medications on File Prior to Visit   Medication Sig Dispense Refill    amLODIPine (NORVASC) 10 MG tablet 1 tablet Orally Once a day for 30 days      atorvastatin (LIPITOR) 40 MG tablet 1 tablet Orally Once a day for 30 days      benztropine (COGENTIN) 0.5 MG tablet Take 0.5 mg by mouth 2 (two) times daily.      busPIRone (BUSPAR) 10 MG tablet Take 10 mg by mouth 3 (three) times daily.      cariprazine (VRAYLAR) 4.5 mg Cap 1 capsule.      cholecalciferol, vitamin D3, 1,250 mcg (50,000 unit) capsule Take 50,000 Units by mouth every 7 days.      diclofenac sodium (VOLTAREN) 1 % Gel 2 gm Transdermal 4 times a day as needed to affected area for 30 days      diphenhydrAMINE (BENADRYL) 50 MG capsule 2 tablets as needed Orally once daily at bedtime      docusate sodium (COLACE) 100 MG capsule 1 capsule as needed Orally Once a day for 30 days       "DULoxetine (CYMBALTA) 60 MG capsule 1 capsule.      gabapentin (NEURONTIN) 600 MG tablet 1 tablet Orally three times per day for 30 day(s)      isosorbide mononitrate (IMDUR) 30 MG 24 hr tablet Take 30 mg by mouth.      lactulose 10 gram/15 ml (CHRONULAC) 10 gram/15 mL (15 mL) solution 30 ml Orally Once a day as needed for constipation for 30 days      meclizine (ANTIVERT) 25 mg tablet 1 tablet as needed Orally three times daily as needed for dizziness for 30 day(s)      omeprazole (PRILOSEC) 20 MG capsule 1 capsule.      pantoprazole (PROTONIX) 20 MG tablet SMARTSI Tablet(s) By Mouth Daily      predniSONE (DELTASONE) 20 MG tablet 1 tablet Orally BID for 5 day(s)      risperiDONE (RISPERDAL) 0.5 MG Tab Take 0.5 mg by mouth 2 (two) times daily.      TAB-A-PEYTON 400 mcg Tab Take 1 tablet by mouth.      traZODone (DESYREL) 50 MG tablet 1 tablet at bedtime as needed Orally Once a day       No current facility-administered medications on file prior to visit.      Family History   Family history unknown: Yes      Social History     Tobacco Use    Smoking status: Never    Smokeless tobacco: Never   Substance Use Topics    Alcohol use: Yes      No LMP recorded (lmp unknown).          Objective:      /85   Pulse 78   Ht 5' 6" (1.676 m)   Wt 98 kg (216 lb)   LMP  (LMP Unknown)   BMI 34.86 kg/m²   General the patient is alert and oriented x3 no acute distress nontoxic-appearing appropriate affect.    Constitutional: Vital signs are examined and stable.  Resp: No signs of labored breathing    LUE: --Skin:  No skin deformity no open ecchymosis No signs of new abrasions or lacerations, no scars, no finger crossing near full fist.           -MSK: STR 5/5 AIN/PIN/Median/Radial/Ulnar motor           -Neuro:  Sensation intact to light touch C5-T1 dermatomes           -Lymphatic: No signs of lymphadenopathy, No signs of swelling,           -CV:Capillary refill is less than 2 seconds. Radial and ulnar pulses 2/4. " Compartments Soft and compressible     Body mass index is 34.86 kg/m².  Ideal body weight: 59.3 kg (130 lb 11.7 oz)  Adjusted ideal body weight: 74.8 kg (164 lb 13.4 oz)  Lab Results   Component Value Date     12/04/2019    K 4.4 12/04/2019    CO2 31 12/04/2019    CALCIUM 9.2 12/04/2019    BUN 8 12/04/2019      Lab Results   Component Value Date    HGB 14.3 12/04/2019    HCT 44.1 12/04/2019       Radiology:  Three views left hand skeletally mature individual showing a left 5th metacarpal base fracture oblique min displaced  CT left hand showing minimally displaced base of the 5th metatarsal        Assessment:         1. Closed nondisplaced fracture of base of fifth metacarpal bone of left hand, initial encounter                  Plan:         No follow-ups on file.    There are no diagnoses linked to this encounter.    Patient has a left 5th metacarpal base fracture.  Discussed the case with the Dr. MARCI CÁRDENAS.  CT scan shows minimal displacement fragments so well aligned.  We will continue with the splinting at this time to hold reduction.  We will start physical therapy gentle range of motion currently we will get her in hand therapy at next visit.  Follow-up under 6 weeks.  Refill pain medication.  Nonweightbearing.      This note/OR report was created with the assistance of  voice recognition software or phone  dictation.  There may be transcription errors as a result of using this technology however minimal. Effort has been made to assure accuracy of transcription but any obvious errors or omissions should be clarified with the author of the document.     No future appointments.        Thomas Alcocer DO  Orthopedic Trauma Surgery  09/12/2023

## 2023-09-25 DIAGNOSIS — R76.8 POSITIVE ANA (ANTINUCLEAR ANTIBODY): Primary | ICD-10-CM

## 2023-09-27 DIAGNOSIS — R06.02 SHORTNESS OF BREATH ON EXERTION: Primary | ICD-10-CM

## 2023-10-18 ENCOUNTER — OFFICE VISIT (OUTPATIENT)
Dept: ORTHOPEDICS | Facility: CLINIC | Age: 63
End: 2023-10-18
Payer: MEDICAID

## 2023-10-18 ENCOUNTER — HOSPITAL ENCOUNTER (OUTPATIENT)
Dept: RADIOLOGY | Facility: CLINIC | Age: 63
Discharge: HOME | End: 2023-10-18
Attending: ORTHOPAEDIC SURGERY
Payer: MEDICAID

## 2023-10-18 VITALS
DIASTOLIC BLOOD PRESSURE: 70 MMHG | HEIGHT: 66 IN | BODY MASS INDEX: 34.55 KG/M2 | HEART RATE: 83 BPM | WEIGHT: 215 LBS | SYSTOLIC BLOOD PRESSURE: 106 MMHG

## 2023-10-18 DIAGNOSIS — S62.347A CLOSED NONDISPLACED FRACTURE OF BASE OF FIFTH METACARPAL BONE OF LEFT HAND, INITIAL ENCOUNTER: Primary | ICD-10-CM

## 2023-10-18 DIAGNOSIS — S62.347A CLOSED NONDISPLACED FRACTURE OF BASE OF FIFTH METACARPAL BONE OF LEFT HAND, INITIAL ENCOUNTER: ICD-10-CM

## 2023-10-18 PROCEDURE — 99024 PR POST-OP FOLLOW-UP VISIT: ICD-10-PCS | Mod: ,,, | Performed by: ORTHOPAEDIC SURGERY

## 2023-10-18 PROCEDURE — 1159F MED LIST DOCD IN RCRD: CPT | Mod: CPTII,,, | Performed by: ORTHOPAEDIC SURGERY

## 2023-10-18 PROCEDURE — 3074F SYST BP LT 130 MM HG: CPT | Mod: CPTII,,, | Performed by: ORTHOPAEDIC SURGERY

## 2023-10-18 PROCEDURE — 73130 XR HAND COMPLETE 3 VIEW LEFT: ICD-10-PCS | Mod: LT,,, | Performed by: ORTHOPAEDIC SURGERY

## 2023-10-18 PROCEDURE — 73130 X-RAY EXAM OF HAND: CPT | Mod: LT,,, | Performed by: ORTHOPAEDIC SURGERY

## 2023-10-18 PROCEDURE — 3078F DIAST BP <80 MM HG: CPT | Mod: CPTII,,, | Performed by: ORTHOPAEDIC SURGERY

## 2023-10-18 PROCEDURE — 3078F PR MOST RECENT DIASTOLIC BLOOD PRESSURE < 80 MM HG: ICD-10-PCS | Mod: CPTII,,, | Performed by: ORTHOPAEDIC SURGERY

## 2023-10-18 PROCEDURE — 1159F PR MEDICATION LIST DOCUMENTED IN MEDICAL RECORD: ICD-10-PCS | Mod: CPTII,,, | Performed by: ORTHOPAEDIC SURGERY

## 2023-10-18 PROCEDURE — 99024 POSTOP FOLLOW-UP VISIT: CPT | Mod: ,,, | Performed by: ORTHOPAEDIC SURGERY

## 2023-10-18 PROCEDURE — 3074F PR MOST RECENT SYSTOLIC BLOOD PRESSURE < 130 MM HG: ICD-10-PCS | Mod: CPTII,,, | Performed by: ORTHOPAEDIC SURGERY

## 2023-10-18 NOTE — PROGRESS NOTES
Subjective:       Patient ID: Do Rosado is a 62 y.o. female.  Chief Complaint   Patient presents with    Follow-up     Follow up LT prox 5th  fx 8/12/23. Complaints of pain, limited ROM, and trembling in the finger. Takes tylenol/ibuprofen OTC for pain but little no relief. States that she fell twice but on RT hand since her last visit.          HPI:  Patient is 8 weeks follow-up for a fall outside.  Patient has a significant history of bipolar disorder.  Patient states she is having some pain over fracture site she removed her splint at home she has not gotten him physical therapy.  She has some stiffness of her finger    ROS:  Constitutional: Denies fever chills  Eyes: No change in vision  ENT: No ringing or current infections  CV: No chest pain  Resp: No labored breathing  MSK: Pain evident at site of injury located in HPI,   Integ: No signs of abrasions or lacerations  Neuro: No numbness or tingling  Lymphatic: No swelling outside the area of injury     Past Medical History:   Diagnosis Date    Hypertension     Mixed hyperlipidemia       History reviewed. No pertinent surgical history.   Current Outpatient Medications on File Prior to Visit   Medication Sig Dispense Refill    amLODIPine (NORVASC) 10 MG tablet 1 tablet Orally Once a day for 30 days      atorvastatin (LIPITOR) 40 MG tablet 1 tablet Orally Once a day for 30 days      benztropine (COGENTIN) 0.5 MG tablet Take 0.5 mg by mouth 2 (two) times daily.      busPIRone (BUSPAR) 10 MG tablet Take 10 mg by mouth 3 (three) times daily.      cariprazine (VRAYLAR) 4.5 mg Cap 1 capsule.      cholecalciferol, vitamin D3, 1,250 mcg (50,000 unit) capsule Take 50,000 Units by mouth every 7 days.      diclofenac sodium (VOLTAREN) 1 % Gel 2 gm Transdermal 4 times a day as needed to affected area for 30 days      diphenhydrAMINE (BENADRYL) 50 MG capsule 2 tablets as needed Orally once daily at bedtime      docusate sodium (COLACE) 100 MG capsule 1 capsule  "as needed Orally Once a day for 30 days      DULoxetine (CYMBALTA) 60 MG capsule 1 capsule.      gabapentin (NEURONTIN) 600 MG tablet 1 tablet Orally three times per day for 30 day(s)      HYDROcodone-acetaminophen (NORCO) 5-325 mg per tablet Take 1 tablet by mouth every 6 (six) hours as needed for Pain. 28 tablet 0    isosorbide mononitrate (IMDUR) 30 MG 24 hr tablet Take 30 mg by mouth.      lactulose 10 gram/15 ml (CHRONULAC) 10 gram/15 mL (15 mL) solution 30 ml Orally Once a day as needed for constipation for 30 days      meclizine (ANTIVERT) 25 mg tablet 1 tablet as needed Orally three times daily as needed for dizziness for 30 day(s)      omeprazole (PRILOSEC) 20 MG capsule 1 capsule.      pantoprazole (PROTONIX) 20 MG tablet SMARTSI Tablet(s) By Mouth Daily      predniSONE (DELTASONE) 20 MG tablet 1 tablet Orally BID for 5 day(s)      risperiDONE (RISPERDAL) 0.5 MG Tab Take 0.5 mg by mouth 2 (two) times daily.      TAB-A-PEYTON 400 mcg Tab Take 1 tablet by mouth.      traZODone (DESYREL) 50 MG tablet 1 tablet at bedtime as needed Orally Once a day       No current facility-administered medications on file prior to visit.      Family History   Family history unknown: Yes      Social History     Tobacco Use    Smoking status: Never    Smokeless tobacco: Never   Substance Use Topics    Alcohol use: Yes      No LMP recorded.          Objective:      /70   Pulse 83   Ht 5' 6" (1.676 m)   Wt 97.5 kg (215 lb)   BMI 34.70 kg/m²   General the patient is alert and oriented x3 no acute distress nontoxic-appearing appropriate affect.    Constitutional: Vital signs are examined and stable.  Resp: No signs of labored breathing    LUE: --Skin:  No skin deformity no open ecchymosis No signs of new abrasions or lacerations, no scars, no finger crossing near full fist. Some mild stiffness            -MSK: STR 5/5 AIN/PIN/Median/Radial/Ulnar motor           -Neuro:  Sensation intact to light touch C5-T1 dermatomes    "        -Lymphatic: No signs of lymphadenopathy, No signs of swelling,           -CV:Capillary refill is less than 2 seconds. Radial and ulnar pulses 2/4. Compartments Soft and compressible     Body mass index is 34.7 kg/m².  Ideal body weight: 59.3 kg (130 lb 11.7 oz)  Adjusted ideal body weight: 74.6 kg (164 lb 7 oz)  Lab Results   Component Value Date     12/04/2019    K 4.4 12/04/2019    CO2 31 12/04/2019    CALCIUM 9.2 12/04/2019    BUN 8 12/04/2019      Lab Results   Component Value Date    HGB 14.3 12/04/2019    HCT 44.1 12/04/2019       Radiology:  Three views left hand skeletally mature individual showing a left 5th metacarpal base fracture oblique min displaced  CT left hand showing minimally displaced base of the 5th metatarsal        Assessment:         1. Closed nondisplaced fracture of base of fifth metacarpal bone of left hand, initial encounter  X-Ray Hand 3 view Left                Plan:         No follow-ups on file.    Do was seen today for follow-up.    Diagnoses and all orders for this visit:    Closed nondisplaced fracture of base of fifth metacarpal bone of left hand, initial encounter  -     X-Ray Hand 3 view Left; Future        Patient has a left 5th metacarpal base fracture.  Patient does not have her brace today did not get in hand therapy.  She is been noncompliant with treatment.  She states that her 5th finger is twitching.  She is asking for pain medication.  We will prescribe her non controlled medications.  We will reach print out hand therapy we will try to help her get set up although it is the patient's responsibility to find a hand therapist.  And we will recommend she replace as her splint at home      This note/OR report was created with the assistance of  voice recognition software or phone  dictation.  There may be transcription errors as a result of using this technology however minimal. Effort has been made to assure accuracy of transcription but any obvious errors  or omissions should be clarified with the author of the document.     Future Appointments   Date Time Provider Department [place]   11/24/2023 10:00 [facility]    8/27/2024 10:00 AM Nick Hutchison MD [facility] ARCELIA Alcocer DO  Orthopedic Trauma Surgery  10/18/2023